# Patient Record
Sex: MALE | Race: WHITE
[De-identification: names, ages, dates, MRNs, and addresses within clinical notes are randomized per-mention and may not be internally consistent; named-entity substitution may affect disease eponyms.]

---

## 2009-01-12 VITALS — SYSTOLIC BLOOD PRESSURE: 5 MMHG

## 2019-09-19 ENCOUNTER — HOSPITAL ENCOUNTER (OUTPATIENT)
Dept: HOSPITAL 19 - COL.CAR | Age: 79
LOS: 1 days | Discharge: HOME | End: 2019-09-20
Attending: INTERNAL MEDICINE
Payer: MEDICARE

## 2019-09-19 VITALS — DIASTOLIC BLOOD PRESSURE: 53 MMHG | TEMPERATURE: 98.3 F | SYSTOLIC BLOOD PRESSURE: 129 MMHG | HEART RATE: 44 BPM

## 2019-09-19 VITALS — OXYGEN SATURATION: 95 %

## 2019-09-19 VITALS — OXYGEN SATURATION: 98 %

## 2019-09-19 VITALS — OXYGEN SATURATION: 96 %

## 2019-09-19 VITALS — HEIGHT: 70 IN | WEIGHT: 175.49 LBS | BODY MASS INDEX: 25.12 KG/M2

## 2019-09-19 VITALS — HEART RATE: 51 BPM | SYSTOLIC BLOOD PRESSURE: 181 MMHG | DIASTOLIC BLOOD PRESSURE: 85 MMHG | TEMPERATURE: 98.2 F

## 2019-09-19 VITALS — OXYGEN SATURATION: 97 %

## 2019-09-19 VITALS — HEART RATE: 50 BPM | DIASTOLIC BLOOD PRESSURE: 48 MMHG | SYSTOLIC BLOOD PRESSURE: 115 MMHG

## 2019-09-19 VITALS — SYSTOLIC BLOOD PRESSURE: 121 MMHG | DIASTOLIC BLOOD PRESSURE: 57 MMHG | HEART RATE: 54 BPM

## 2019-09-19 VITALS — TEMPERATURE: 98 F | HEART RATE: 47 BPM | DIASTOLIC BLOOD PRESSURE: 52 MMHG | SYSTOLIC BLOOD PRESSURE: 128 MMHG

## 2019-09-19 VITALS — SYSTOLIC BLOOD PRESSURE: 124 MMHG | HEART RATE: 51 BPM | TEMPERATURE: 98.2 F | DIASTOLIC BLOOD PRESSURE: 57 MMHG

## 2019-09-19 VITALS — SYSTOLIC BLOOD PRESSURE: 105 MMHG | DIASTOLIC BLOOD PRESSURE: 42 MMHG | HEART RATE: 50 BPM

## 2019-09-19 VITALS — SYSTOLIC BLOOD PRESSURE: 161 MMHG | DIASTOLIC BLOOD PRESSURE: 58 MMHG | HEART RATE: 55 BPM

## 2019-09-19 VITALS — DIASTOLIC BLOOD PRESSURE: 55 MMHG | HEART RATE: 44 BPM | SYSTOLIC BLOOD PRESSURE: 119 MMHG

## 2019-09-19 VITALS — DIASTOLIC BLOOD PRESSURE: 52 MMHG | TEMPERATURE: 98 F | HEART RATE: 47 BPM | SYSTOLIC BLOOD PRESSURE: 128 MMHG

## 2019-09-19 VITALS — OXYGEN SATURATION: 99 %

## 2019-09-19 VITALS — DIASTOLIC BLOOD PRESSURE: 47 MMHG | HEART RATE: 51 BPM | TEMPERATURE: 97.5 F | SYSTOLIC BLOOD PRESSURE: 93 MMHG

## 2019-09-19 VITALS — SYSTOLIC BLOOD PRESSURE: 108 MMHG | DIASTOLIC BLOOD PRESSURE: 51 MMHG | HEART RATE: 48 BPM

## 2019-09-19 VITALS — SYSTOLIC BLOOD PRESSURE: 110 MMHG | DIASTOLIC BLOOD PRESSURE: 72 MMHG | HEART RATE: 48 BPM

## 2019-09-19 VITALS — DIASTOLIC BLOOD PRESSURE: 54 MMHG | SYSTOLIC BLOOD PRESSURE: 116 MMHG | HEART RATE: 51 BPM

## 2019-09-19 VITALS — DIASTOLIC BLOOD PRESSURE: 50 MMHG | HEART RATE: 40 BPM | SYSTOLIC BLOOD PRESSURE: 120 MMHG

## 2019-09-19 VITALS — HEART RATE: 45 BPM | SYSTOLIC BLOOD PRESSURE: 125 MMHG | DIASTOLIC BLOOD PRESSURE: 61 MMHG

## 2019-09-19 VITALS — OXYGEN SATURATION: 94 %

## 2019-09-19 DIAGNOSIS — M06.9: ICD-10-CM

## 2019-09-19 DIAGNOSIS — Z92.3: ICD-10-CM

## 2019-09-19 DIAGNOSIS — Z98.1: ICD-10-CM

## 2019-09-19 DIAGNOSIS — Z82.49: ICD-10-CM

## 2019-09-19 DIAGNOSIS — Z95.818: ICD-10-CM

## 2019-09-19 DIAGNOSIS — Z96.641: ICD-10-CM

## 2019-09-19 DIAGNOSIS — I47.1: ICD-10-CM

## 2019-09-19 DIAGNOSIS — I08.1: ICD-10-CM

## 2019-09-19 DIAGNOSIS — I47.2: ICD-10-CM

## 2019-09-19 DIAGNOSIS — Z83.3: ICD-10-CM

## 2019-09-19 DIAGNOSIS — Z82.61: ICD-10-CM

## 2019-09-19 DIAGNOSIS — M19.90: ICD-10-CM

## 2019-09-19 DIAGNOSIS — Z79.82: ICD-10-CM

## 2019-09-19 DIAGNOSIS — Z85.46: ICD-10-CM

## 2019-09-19 DIAGNOSIS — I25.10: Primary | ICD-10-CM

## 2019-09-19 DIAGNOSIS — Z88.6: ICD-10-CM

## 2019-09-19 DIAGNOSIS — Z96.642: ICD-10-CM

## 2019-09-19 DIAGNOSIS — I49.3: ICD-10-CM

## 2019-09-19 DIAGNOSIS — I10: ICD-10-CM

## 2019-09-19 DIAGNOSIS — E78.5: ICD-10-CM

## 2019-09-19 DIAGNOSIS — Z95.5: ICD-10-CM

## 2019-09-19 DIAGNOSIS — Z88.2: ICD-10-CM

## 2019-09-19 DIAGNOSIS — Z90.79: ICD-10-CM

## 2019-09-19 LAB
ANION GAP SERPL CALC-SCNC: 8 MMOL/L (ref 7–16)
BUN SERPL-MCNC: 18 MG/DL (ref 9–20)
CALCIUM SERPL-MCNC: 9.3 MG/DL (ref 8.4–10.2)
CHLORIDE SERPL-SCNC: 105 MMOL/L (ref 98–107)
CO2 SERPL-SCNC: 27 MMOL/L (ref 22–30)
CREAT SERPL-SCNC: 0.9 UMOL/L (ref 0.66–1.25)
ERYTHROCYTE [DISTWIDTH] IN BLOOD BY AUTOMATED COUNT: 14.7 % (ref 11.5–14.5)
GLUCOSE SERPL-MCNC: 104 MG/DL (ref 74–106)
HCT VFR BLD AUTO: 35.6 % (ref 42–52)
HGB BLD-MCNC: 11.7 G/DL (ref 13.5–18)
INR BLD: 1 (ref 0.8–3)
MCH RBC QN AUTO: 32 PG (ref 27–31)
MCHC RBC AUTO-ENTMCNC: 33 G/DL (ref 33–37)
MCV RBC AUTO: 97 FL (ref 80–100)
PLATELET # BLD AUTO: 268 K/MM3 (ref 130–400)
PMV BLD AUTO: 10.9 FL (ref 7.4–10.4)
POTASSIUM SERPL-SCNC: 4.7 MMOL/L (ref 3.4–5)
PROTHROMBIN TIME: 12 SECONDS (ref 9.7–12.8)
RBC # BLD AUTO: 3.67 M/MM3 (ref 4.2–5.6)
SODIUM SERPL-SCNC: 140 MMOL/L (ref 137–145)

## 2019-09-19 PROCEDURE — C9600 PERC DRUG-EL COR STENT SING: HCPCS

## 2019-09-19 PROCEDURE — C1764 EVENT RECORDER, CARDIAC: HCPCS

## 2019-09-19 PROCEDURE — C1874 STENT, COATED/COV W/DEL SYS: HCPCS

## 2019-09-19 PROCEDURE — C1887 CATHETER, GUIDING: HCPCS

## 2019-09-19 PROCEDURE — C1769 GUIDE WIRE: HCPCS

## 2019-09-19 NOTE — NUR
Dr. Skinner paged at [phone] and returns call at [phone]. Report provided to include
patient with asymptomatic hypotension, SBP ranging from 79-86 and MAP <65. MD
provides order for 500ml bolus 1/2NS. Will leave nitro patch on at this time.
MD asks that nursing staff "continue to monitor". Care ongoing.

## 2019-09-19 NOTE — NUR
Pt transferred to ICU 2 at this time with montior in place. VS equipment
connected on arrival to ICU. VS's WNL at this time. Pt A&O x3. Bedside handoff
to LALIT Oconnor. Right radial access site assessed; 12 cc air remain in TR band.
Site without bleeding, oozing, bruising. Surrounding tissue soft. No s/sx of
impaired blood flow to hand noted. Pt denies pain/numbness to fingers. All
questions answered at this time.

## 2019-09-19 NOTE — NUR
SEE MERGE DOCUEMENTATION FOR MEDICATION ADMINISTRATION TIMES AND INTRA\POST
PROCEDURE SEDATION ASSESSMENTS. PLAN  FOR NASEEM FIRST; LHC TO  FOLLOW, POSSIBLE
RHC  AND LOOP. CONSENT OBTAINED.

## 2019-09-19 NOTE — NUR
Patient arrives to ICU 2 from cath lab. He is attached to monitors and
assesment and vitals are as charted. Right radial site is CDI and soft with TR
band in place and 12mls air instilled per report. Care assumed.

## 2019-09-19 NOTE — NUR
Assessment complete. Patient resting in bed, he is alert and oriented x4.
Complaints of a mild headache rated 3/10, requesting tylenol, to be provided.
Patients lungs are clear in all fields with diminished bases bilaterally. HR
and rhythm are regular, he is bradycardic throwing occasional PVC's. Normal S1
and S2 heard. Bowel sounds are active x4. Patient pulses are palpable in all
extremities. Cath site is clean and dry, no hematoma or bruising noted. TR
band in place, 2ml of air removed. Patient has no further needs at this time.
Will continue to monitor. Call light within reach.

## 2019-09-20 VITALS — OXYGEN SATURATION: 99 %

## 2019-09-20 VITALS — OXYGEN SATURATION: 94 %

## 2019-09-20 VITALS — OXYGEN SATURATION: 96 %

## 2019-09-20 VITALS — OXYGEN SATURATION: 97 %

## 2019-09-20 VITALS — OXYGEN SATURATION: 95 %

## 2019-09-20 VITALS — OXYGEN SATURATION: 98 %

## 2019-09-20 VITALS
OXYGEN SATURATION: 96 % | SYSTOLIC BLOOD PRESSURE: 109 MMHG | DIASTOLIC BLOOD PRESSURE: 45 MMHG | HEART RATE: 50 BPM | TEMPERATURE: 97.8 F

## 2019-09-20 VITALS
HEART RATE: 50 BPM | DIASTOLIC BLOOD PRESSURE: 49 MMHG | TEMPERATURE: 97.6 F | SYSTOLIC BLOOD PRESSURE: 103 MMHG | OXYGEN SATURATION: 98 %

## 2019-09-20 VITALS — OXYGEN SATURATION: 92 %

## 2019-09-20 VITALS — OXYGEN SATURATION: 93 %

## 2019-09-20 VITALS — OXYGEN SATURATION: 100 %

## 2019-09-20 VITALS — HEART RATE: 48 BPM | SYSTOLIC BLOOD PRESSURE: 135 MMHG | DIASTOLIC BLOOD PRESSURE: 59 MMHG | TEMPERATURE: 97.9 F

## 2019-09-20 LAB
ANION GAP SERPL CALC-SCNC: 6 MMOL/L (ref 7–16)
BASOPHILS NFR BLD MANUAL: 3 % (ref 0–2)
BUN SERPL-MCNC: 15 MG/DL (ref 9–20)
CALCIUM SERPL-MCNC: 8.5 MG/DL (ref 8.4–10.2)
CHLORIDE SERPL-SCNC: 105 MMOL/L (ref 98–107)
CK SERPL-CCNC: 61 U/L (ref 55–170)
CO2 SERPL-SCNC: 25 MMOL/L (ref 22–30)
CREAT SERPL-SCNC: 0.84 UMOL/L (ref 0.66–1.25)
EOSINOPHIL NFR BLD: 4 % (ref 0–4)
ERYTHROCYTE [DISTWIDTH] IN BLOOD BY AUTOMATED COUNT: 14.6 % (ref 11.5–14.5)
GLUCOSE SERPL-MCNC: 83 MG/DL (ref 74–106)
HCT VFR BLD AUTO: 30.7 % (ref 42–52)
HGB BLD-MCNC: 10.2 G/DL (ref 13.5–18)
HYPOCHROMIA BLD QL SMEAR: (no result)
LYMPHOCYTES NFR BLD MANUAL: 20 % (ref 20–51)
MCH RBC QN AUTO: 32 PG (ref 27–31)
MCHC RBC AUTO-ENTMCNC: 33 G/DL (ref 33–37)
MCV RBC AUTO: 95 FL (ref 80–100)
MONOCYTES NFR BLD: 5 % (ref 1.7–9.3)
NEUTS BAND NFR BLD: 1 % (ref 0–10)
NEUTS HYPERSEG BLD QL SMEAR: PRESENT
NEUTS SEG NFR BLD MANUAL: 67 % (ref 42–75.2)
PLATELET # BLD AUTO: 210 K/MM3 (ref 130–400)
PLATELET BLD QL SMEAR: NORMAL
PMV BLD AUTO: 10.2 FL (ref 7.4–10.4)
POTASSIUM SERPL-SCNC: 4.1 MMOL/L (ref 3.4–5)
RBC # BLD AUTO: 3.22 M/MM3 (ref 4.2–5.6)
SODIUM SERPL-SCNC: 136 MMOL/L (ref 137–145)

## 2019-09-20 NOTE — NUR
DISCHARGE INSTRUCTIONS AND PACKET REVIEWED. FOLLOW UP APPOINTMENTS DISCUSSED.
IV DISCONTINUED. PATIENT TAKEN OUT TO FRONT ENTRANCE. HE WANTS TO SIT ON BENCH
UNTIL HIS WIFE ARRIVES IN A FEW MINUTES.

## 2019-09-20 NOTE — NUR
Bedside report given to LALIT Lay and student nurse. Cath site assessed and
remains clean and dry, no hematoma or bruising. Transfer of care at this time.

## 2019-09-20 NOTE — NUR
Patient asleep at this time. Awakens easily to name. No complaints of pain.
Vitals obtained and remain WNL. Patient has no further needs. Will continue to
monitor. Call light within reach.

## 2019-09-20 NOTE — NUR
Patient resting in bed. He is awake and requests to get up to the restroom
again. Assisted with monitoring equipment and cords. Patient returns to bed
after urinating and passing gas. No complaints of pain. Vitals obtained and
remain WNL. Patient has no further needs. Will continue to monitor. Call light
within reach.

## 2021-06-18 ENCOUNTER — HOSPITAL ENCOUNTER (OUTPATIENT)
Dept: HOSPITAL 19 - COL.CARD | Age: 81
End: 2021-06-18
Attending: INTERNAL MEDICINE
Payer: MEDICARE

## 2021-06-18 VITALS — SYSTOLIC BLOOD PRESSURE: 157 MMHG | DIASTOLIC BLOOD PRESSURE: 78 MMHG | HEART RATE: 82 BPM

## 2021-06-18 VITALS — HEART RATE: 52 BPM | DIASTOLIC BLOOD PRESSURE: 75 MMHG | SYSTOLIC BLOOD PRESSURE: 176 MMHG

## 2021-06-18 VITALS — HEART RATE: 59 BPM | DIASTOLIC BLOOD PRESSURE: 75 MMHG | SYSTOLIC BLOOD PRESSURE: 158 MMHG

## 2021-06-18 VITALS — SYSTOLIC BLOOD PRESSURE: 155 MMHG | DIASTOLIC BLOOD PRESSURE: 80 MMHG | HEART RATE: 83 BPM

## 2021-06-18 VITALS — HEART RATE: 88 BPM | DIASTOLIC BLOOD PRESSURE: 75 MMHG | SYSTOLIC BLOOD PRESSURE: 163 MMHG

## 2021-06-18 VITALS — HEIGHT: 70 IN | BODY MASS INDEX: 25.25 KG/M2 | WEIGHT: 176.37 LBS

## 2021-06-18 VITALS — SYSTOLIC BLOOD PRESSURE: 154 MMHG | HEART RATE: 55 BPM | DIASTOLIC BLOOD PRESSURE: 81 MMHG

## 2021-06-18 VITALS — HEART RATE: 96 BPM | SYSTOLIC BLOOD PRESSURE: 162 MMHG | DIASTOLIC BLOOD PRESSURE: 82 MMHG

## 2021-06-18 DIAGNOSIS — Z01.810: ICD-10-CM

## 2021-06-18 DIAGNOSIS — I25.10: Primary | ICD-10-CM

## 2021-06-18 PROCEDURE — A9500 TC99M SESTAMIBI: HCPCS

## 2021-07-07 ENCOUNTER — HOSPITAL ENCOUNTER (OUTPATIENT)
Dept: HOSPITAL 19 - COL.CAR | Age: 81
Discharge: HOME | End: 2021-07-07
Attending: INTERNAL MEDICINE
Payer: MEDICARE

## 2021-07-07 VITALS — HEART RATE: 61 BPM | SYSTOLIC BLOOD PRESSURE: 115 MMHG | DIASTOLIC BLOOD PRESSURE: 62 MMHG

## 2021-07-07 VITALS — HEART RATE: 59 BPM | SYSTOLIC BLOOD PRESSURE: 116 MMHG | DIASTOLIC BLOOD PRESSURE: 54 MMHG

## 2021-07-07 VITALS — SYSTOLIC BLOOD PRESSURE: 129 MMHG | HEART RATE: 60 BPM | DIASTOLIC BLOOD PRESSURE: 60 MMHG

## 2021-07-07 VITALS — HEIGHT: 70 IN | BODY MASS INDEX: 25.56 KG/M2 | WEIGHT: 178.57 LBS

## 2021-07-07 VITALS — DIASTOLIC BLOOD PRESSURE: 60 MMHG | HEART RATE: 57 BPM | SYSTOLIC BLOOD PRESSURE: 133 MMHG | TEMPERATURE: 98.3 F

## 2021-07-07 VITALS — SYSTOLIC BLOOD PRESSURE: 136 MMHG | HEART RATE: 52 BPM | DIASTOLIC BLOOD PRESSURE: 69 MMHG

## 2021-07-07 VITALS — DIASTOLIC BLOOD PRESSURE: 57 MMHG | SYSTOLIC BLOOD PRESSURE: 113 MMHG | HEART RATE: 60 BPM

## 2021-07-07 VITALS — HEART RATE: 52 BPM | SYSTOLIC BLOOD PRESSURE: 136 MMHG | DIASTOLIC BLOOD PRESSURE: 66 MMHG

## 2021-07-07 VITALS — DIASTOLIC BLOOD PRESSURE: 65 MMHG | HEART RATE: 55 BPM | SYSTOLIC BLOOD PRESSURE: 126 MMHG

## 2021-07-07 VITALS — HEART RATE: 58 BPM | DIASTOLIC BLOOD PRESSURE: 55 MMHG | SYSTOLIC BLOOD PRESSURE: 108 MMHG

## 2021-07-07 DIAGNOSIS — Q21.1: ICD-10-CM

## 2021-07-07 DIAGNOSIS — Z79.02: ICD-10-CM

## 2021-07-07 DIAGNOSIS — E78.5: ICD-10-CM

## 2021-07-07 DIAGNOSIS — T82.855A: ICD-10-CM

## 2021-07-07 DIAGNOSIS — Z95.9: ICD-10-CM

## 2021-07-07 DIAGNOSIS — Z20.822: ICD-10-CM

## 2021-07-07 DIAGNOSIS — I10: ICD-10-CM

## 2021-07-07 DIAGNOSIS — Z79.899: ICD-10-CM

## 2021-07-07 DIAGNOSIS — M16.11: ICD-10-CM

## 2021-07-07 DIAGNOSIS — I25.10: Primary | ICD-10-CM

## 2021-07-07 DIAGNOSIS — C61: ICD-10-CM

## 2021-07-07 DIAGNOSIS — I25.82: ICD-10-CM

## 2021-07-07 DIAGNOSIS — Z95.5: ICD-10-CM

## 2021-07-07 LAB
ANION GAP SERPL CALC-SCNC: 3 MMOL/L (ref 7–16)
APTT PPP: 32.6 SECONDS (ref 26–37)
BUN SERPL-MCNC: 19 MG/DL (ref 9–20)
CALCIUM SERPL-MCNC: 9.5 MG/DL (ref 8.4–10.2)
CHLORIDE SERPL-SCNC: 110 MMOL/L (ref 98–107)
CO2 SERPL-SCNC: 27 MMOL/L (ref 22–30)
CREAT SERPL-SCNC: 0.84 UMOL/L (ref 0.66–1.25)
ERYTHROCYTE [DISTWIDTH] IN BLOOD BY AUTOMATED COUNT: 14.7 % (ref 11.5–14.5)
GLUCOSE SERPL-MCNC: 95 MG/DL (ref 74–106)
HCT VFR BLD AUTO: 36.6 % (ref 42–52)
HGB BLD-MCNC: 12 G/DL (ref 13.5–18)
INR BLD: 1 (ref 0.8–3)
MCH RBC QN AUTO: 32 PG (ref 27–31)
MCHC RBC AUTO-ENTMCNC: 33 G/DL (ref 33–37)
MCV RBC AUTO: 97 FL (ref 80–100)
PLATELET # BLD AUTO: 273 K/MM3 (ref 130–400)
PMV BLD AUTO: 9.6 FL (ref 7.4–10.4)
POTASSIUM SERPL-SCNC: 4.2 MMOL/L (ref 3.4–5)
PROTHROMBIN TIME: 11.5 SECONDS (ref 9.7–12.8)
RBC # BLD AUTO: 3.76 M/MM3 (ref 4.2–5.6)
SODIUM SERPL-SCNC: 139 MMOL/L (ref 137–145)

## 2021-07-07 NOTE — NUR
Pt is dressed and ready to go home, sitting in chair in room 11.  tr band was
deflated with no problem, site dressed with bandaid, folded 2x2 and coban, cms
intact distal.  IV was dc'd with cath intact, dressing applied.  i have
reviewed dc and fu instructions with pt.  pt verbalized understanding and
denied any questions.  pt now waiting for his wife to pick him up.

## 2021-07-07 NOTE — NUR
PT BACK FROM CATH LAB.  REPORT FROM VANESA COHN.  PT AWAKE AND ALERT, PWD, TR BAND
TO RT WRIST, CMS INTACT DISTAL.  NSR-SB ON MONITOR.  PT UPDATED ON POC, LUNCH
ORDERED. CALL LIGHT IN REACH.